# Patient Record
Sex: FEMALE | Race: WHITE | NOT HISPANIC OR LATINO | Employment: FULL TIME | URBAN - NONMETROPOLITAN AREA
[De-identification: names, ages, dates, MRNs, and addresses within clinical notes are randomized per-mention and may not be internally consistent; named-entity substitution may affect disease eponyms.]

---

## 2017-02-03 ENCOUNTER — HOSPITAL ENCOUNTER (EMERGENCY)
Facility: HOSPITAL | Age: 42
Discharge: HOME | End: 2017-02-03
Attending: FAMILY MEDICINE | Admitting: FAMILY MEDICINE

## 2017-02-03 ENCOUNTER — APPOINTMENT (OUTPATIENT)
Dept: ULTRASOUND IMAGING | Facility: HOSPITAL | Age: 42
End: 2017-02-03

## 2017-02-03 VITALS
WEIGHT: 161 LBS | TEMPERATURE: 97.8 F | DIASTOLIC BLOOD PRESSURE: 79 MMHG | HEIGHT: 66 IN | OXYGEN SATURATION: 97 % | RESPIRATION RATE: 18 BRPM | SYSTOLIC BLOOD PRESSURE: 129 MMHG | BODY MASS INDEX: 25.88 KG/M2 | HEART RATE: 88 BPM

## 2017-02-03 DIAGNOSIS — M79.89 LEG SWELLING: Primary | ICD-10-CM

## 2017-02-03 PROCEDURE — 94770: CPT | Performed by: NURSE PRACTITIONER

## 2017-02-03 PROCEDURE — 99283 EMERGENCY DEPT VISIT LOW MDM: CPT

## 2017-02-03 PROCEDURE — 93971 EXTREMITY STUDY: CPT

## 2017-02-04 NOTE — ED PROVIDER NOTES
Subjective   Patient is a 41 y.o. female presenting with lower extremity pain.   Lower Extremity Issue   Location:  Leg  Time since incident:  10 hours  Injury: no    Leg location:  L lower leg  Pain details:     Severity:  No pain  Chronicity:  New  Foreign body present:  No foreign bodies  Prior injury to area:  No  Associated symptoms: swelling    Associated symptoms: no fever      Patient is a 41-year-old female presents to the ER at the request of her plastic surgeon.  She had surgery on Tuesday of this week, 3 days ago.  She had liposuction of both legs, tummy tuck, breast surgery.Beginning this morning she noticed some swelling of her left lower leg.  She called her surgeon, who told her to come into the ER for an ultrasound.  She reports no pain other than the soreness from her surgeries which is consistent with her right leg. No redness, no tightness. She denies chest pain or shortness of breath.  She has no history of DVT or any blood clots.  No family history of blood clots.  She is a nonsmoker and is not taking any oral contraceptive pills.      Review of Systems   Constitutional: Negative for chills and fever.   HENT: Negative for sneezing and sore throat.    Eyes: Negative for pain and redness.   Respiratory: Negative for cough and shortness of breath.    Cardiovascular: Negative for chest pain and palpitations.   Gastrointestinal: Negative for abdominal pain and nausea.   Genitourinary: Negative for dysuria and frequency.   Skin: Negative for color change and rash.   Neurological: Negative for dizziness, weakness and headaches.       History reviewed. No pertinent past medical history.    No Known Allergies    Past Surgical History   Procedure Laterality Date   • Breast surgery     • Liposuction abdominal     • Abdominal surgery       abdominoplasty       History reviewed. No pertinent family history.    Social History     Social History   • Marital status:      Spouse name: N/A   • Number of  children: N/A   • Years of education: N/A     Social History Main Topics   • Smoking status: Never Smoker   • Smokeless tobacco: None   • Alcohol use No   • Drug use: No   • Sexual activity: Yes     Partners: Male     Other Topics Concern   • None     Social History Narrative   • None           Objective   Physical Exam   Constitutional: She is oriented to person, place, and time and well-developed, well-nourished, and in no distress. She appears well-developed and well-nourished.   HENT:   Head: Normocephalic and atraumatic.   Right Ear: External ear normal.   Left Ear: External ear normal.   Neck: Neck supple.   Cardiovascular: Normal rate, regular rhythm, normal heart sounds and intact distal pulses.  Exam reveals no gallop and no friction rub.    No murmur heard.  Pulmonary/Chest: Effort normal and breath sounds normal. No respiratory distress. She has no wheezes. She has no rales.   Musculoskeletal: Normal range of motion. She exhibits no edema or tenderness.   both lower legs symmetric in size  Left lower leg nontender, not warm to touch, not tense   Neurological: She is alert and oriented to person, place, and time.   Skin: No erythema.   Psychiatric: She has a normal mood and affect. Her behavior is normal. Mood, affect and thought content normal.   Nursing note and vitals reviewed.      Procedures         ED Course  ED Course     Doppler LLE  CONCLUSION: No sonographic evidence for acute deep venous thrombosis of left-sided. common femoral, superficial femoral or popliteal veins.         MDM  Number of Diagnoses or Management Options  Leg swelling: new and requires workup  Risk of Complications, Morbidity, and/or Mortality  Presenting problems: low  Diagnostic procedures: low  Management options: minimal    Patient Progress  Patient progress: stable      Final diagnoses:   Leg swelling     Dr. Vazquez alas is the attending physician for this patient. He is aware of the patient's status and agrees  with the assessment and plan.          This document has been electronically signed by Cj Felix MD on February 3, 2017 9:17 PM          Cj Felix MD  Resident  02/03/17 [id number]